# Patient Record
Sex: FEMALE | Race: WHITE | Employment: UNEMPLOYED | URBAN - METROPOLITAN AREA
[De-identification: names, ages, dates, MRNs, and addresses within clinical notes are randomized per-mention and may not be internally consistent; named-entity substitution may affect disease eponyms.]

---

## 2017-08-03 ENCOUNTER — HOSPITAL ENCOUNTER (EMERGENCY)
Age: 37
Discharge: HOME | End: 2017-08-03
Attending: EMERGENCY MEDICINE
Payer: MEDICARE

## 2017-08-03 VITALS
HEIGHT: 67 IN | BODY MASS INDEX: 29.03 KG/M2 | RESPIRATION RATE: 16 BRPM | TEMPERATURE: 97.2 F | HEART RATE: 81 BPM | SYSTOLIC BLOOD PRESSURE: 130 MMHG | OXYGEN SATURATION: 96 % | DIASTOLIC BLOOD PRESSURE: 86 MMHG | WEIGHT: 185 LBS

## 2017-08-03 DIAGNOSIS — R21 RASH: Primary | ICD-10-CM

## 2017-08-03 PROCEDURE — 99282 EMERGENCY DEPT VISIT SF MDM: CPT

## 2017-08-03 RX ORDER — CLOTRIMAZOLE AND BETAMETHASONE DIPROPIONATE 10; .64 MG/G; MG/G
CREAM TOPICAL
Qty: 45 G | Refills: 1 | Status: SHIPPED | OUTPATIENT
Start: 2017-08-03 | End: 2017-11-30

## 2017-08-03 ASSESSMENT — PAIN DESCRIPTION - FREQUENCY: FREQUENCY: INTERMITTENT

## 2017-08-03 ASSESSMENT — PAIN SCALES - GENERAL: PAINLEVEL_OUTOF10: 3

## 2017-08-03 ASSESSMENT — PAIN DESCRIPTION - LOCATION: LOCATION: VAGINA

## 2017-11-23 ENCOUNTER — APPOINTMENT (OUTPATIENT)
Dept: ULTRASOUND IMAGING | Age: 37
End: 2017-11-23
Payer: MEDICARE

## 2017-11-23 ENCOUNTER — HOSPITAL ENCOUNTER (EMERGENCY)
Age: 37
Discharge: HOME | End: 2017-11-24
Attending: EMERGENCY MEDICINE
Payer: MEDICARE

## 2017-11-23 VITALS
HEIGHT: 68 IN | DIASTOLIC BLOOD PRESSURE: 78 MMHG | SYSTOLIC BLOOD PRESSURE: 127 MMHG | BODY MASS INDEX: 31.07 KG/M2 | TEMPERATURE: 97.7 F | HEART RATE: 98 BPM | RESPIRATION RATE: 18 BRPM | OXYGEN SATURATION: 98 % | WEIGHT: 205 LBS

## 2017-11-23 DIAGNOSIS — O46.90 VAGINAL BLEEDING IN PREGNANCY: Primary | ICD-10-CM

## 2017-11-23 DIAGNOSIS — N76.0 BV (BACTERIAL VAGINOSIS): ICD-10-CM

## 2017-11-23 DIAGNOSIS — B96.89 BV (BACTERIAL VAGINOSIS): ICD-10-CM

## 2017-11-23 LAB
ABO/RH: NORMAL
BILIRUBIN URINE: NEGATIVE
COLOR: YELLOW
COMMENT UA: ABNORMAL
GLUCOSE URINE: NEGATIVE
HCG QUANTITATIVE: 8794 IU/L
HCT VFR BLD CALC: 33.5 % (ref 36.3–47.1)
HEMOGLOBIN: 11.3 G/DL (ref 11.9–15.1)
KETONES, URINE: ABNORMAL
LEUKOCYTE ESTERASE, URINE: NEGATIVE
MCH RBC QN AUTO: 32.5 PG (ref 25.2–33.5)
MCHC RBC AUTO-ENTMCNC: 33.7 G/DL (ref 28.4–34.8)
MCV RBC AUTO: 96.3 FL (ref 82.6–102.9)
NITRITE, URINE: NEGATIVE
PDW BLD-RTO: 12.4 % (ref 11.8–14.4)
PH UA: 5.5 (ref 5–8)
PLATELET # BLD: 228 K/UL (ref 138–453)
PMV BLD AUTO: 9.8 FL (ref 8.1–13.5)
PROTEIN UA: NEGATIVE
RBC # BLD: 3.48 M/UL (ref 3.95–5.11)
SPECIFIC GRAVITY UA: 1.02 (ref 1–1.03)
TURBIDITY: CLEAR
URINE HGB: NEGATIVE
UROBILINOGEN, URINE: NORMAL
WBC # BLD: 9.2 K/UL (ref 3.5–11.3)

## 2017-11-23 PROCEDURE — 86901 BLOOD TYPING SEROLOGIC RH(D): CPT

## 2017-11-23 PROCEDURE — 99284 EMERGENCY DEPT VISIT MOD MDM: CPT

## 2017-11-23 PROCEDURE — 87480 CANDIDA DNA DIR PROBE: CPT

## 2017-11-23 PROCEDURE — 87660 TRICHOMONAS VAGIN DIR PROBE: CPT

## 2017-11-23 PROCEDURE — 85027 COMPLETE CBC AUTOMATED: CPT

## 2017-11-23 PROCEDURE — 86900 BLOOD TYPING SEROLOGIC ABO: CPT

## 2017-11-23 PROCEDURE — 87591 N.GONORRHOEAE DNA AMP PROB: CPT

## 2017-11-23 PROCEDURE — 81003 URINALYSIS AUTO W/O SCOPE: CPT

## 2017-11-23 PROCEDURE — 87510 GARDNER VAG DNA DIR PROBE: CPT

## 2017-11-23 PROCEDURE — 87491 CHLMYD TRACH DNA AMP PROBE: CPT

## 2017-11-23 PROCEDURE — 84702 CHORIONIC GONADOTROPIN TEST: CPT

## 2017-11-23 PROCEDURE — 76817 TRANSVAGINAL US OBSTETRIC: CPT

## 2017-11-24 LAB
C TRACH DNA GENITAL QL NAA+PROBE: NEGATIVE
DIRECT EXAM: ABNORMAL
Lab: ABNORMAL
N. GONORRHOEAE DNA: NEGATIVE
SPECIMEN DESCRIPTION: ABNORMAL
STATUS: ABNORMAL

## 2017-11-24 RX ORDER — METRONIDAZOLE 500 MG/1
500 TABLET ORAL 2 TIMES DAILY
Qty: 14 TABLET | Refills: 0 | Status: SHIPPED | OUTPATIENT
Start: 2017-11-24 | End: 2017-12-01

## 2017-11-24 RX ORDER — PRENATAL WITH FERROUS FUM AND FOLIC ACID 3080; 920; 120; 400; 22; 1.84; 3; 20; 10; 1; 12; 200; 27; 25; 2 [IU]/1; [IU]/1; MG/1; [IU]/1; MG/1; MG/1; MG/1; MG/1; MG/1; MG/1; UG/1; MG/1; MG/1; MG/1; MG/1
1 TABLET ORAL DAILY
Qty: 30 TABLET | Refills: 0 | Status: ON HOLD | OUTPATIENT
Start: 2017-11-24 | End: 2018-04-21 | Stop reason: HOSPADM

## 2017-11-24 ASSESSMENT — ENCOUNTER SYMPTOMS
DIARRHEA: 0
NAUSEA: 0
ABDOMINAL PAIN: 0
VOMITING: 0
SHORTNESS OF BREATH: 0
CONSTIPATION: 0

## 2017-11-24 NOTE — ED PROVIDER NOTES
[facility]     Emergency Department     Faculty Attestation    I performed a history and physical examination of the patient and discussed management with the resident. I reviewed the residents note and agree with the documented findings and plan of care. Any areas of disagreement are noted on the chart. I was personally present for the key portions of any procedures. I have documented in the chart those procedures where I was not present during the key portions. I have reviewed the emergency nurses triage note. I agree with the chief complaint, past medical history, past surgical history, allergies, medications, social and family history as documented unless otherwise noted below. For Physician Assistant/ Nurse Practitioner cases/documentation I have personally evaluated this patient and have completed at least one if not all key elements of the E/M (history, physical exam, and MDM). Additional findings are as noted. I have personally seen and evaluated the patient. I find the patient's history and physical exam are consistent with the NP/PA documentation. I agree with the care provided, treatment rendered, disposition and follow-up plan. Vaginal bleeding without abdominal pain currently pregnant. Abdomen is soft without peritoneal signs      Critical Care     Mendy Corley M.D.   Attending Emergency  Physician              Thomas Saleh MD  11/23/17 5514

## 2017-11-24 NOTE — ED PROVIDER NOTES
[facility] ED  Emergency Department Encounter  Emergency Medicine Resident     Pt Name: Valorie Chowdhury  MRN: [id number]  [place] 1980  Date of evaluation: 17  PCP:  No primary care provider on file. CHIEF COMPLAINT       Chief Complaint   Patient presents with    Vaginal Bleeding     16 weeks pregnant, no pain, seen at [address] today with a pelvic exam       HISTORY OF PRESENT ILLNESS  (Location/Symptom, Timing/Onset, Context/Setting, Quality, Duration, Modifying Factors, Severity.)      Valorie Chowdhury is a 40 y.o. female who presents with Painless vaginal bleeding. Patient states she is approximately 16 weeks pregnant. She is a .  States she started having vaginal bleeding today. Denies any pain. No vaginal discharge or itching. No dysuria, urinary frequency. Patient denies any fever or chills. No nausea or vomiting. Her last menstrual period was  On . Patient has not had any prenatal workup. She was seen at [facility] today and was told everything was fine and discharged. PAST MEDICAL / SURGICAL / SOCIAL / FAMILY HISTORY     Patient denies any past medical history    Social History     Social History    Marital status: Single     Spouse name: N/A    Number of children: N/A    Years of education: N/A     Occupational History    Not on file. Social History Main Topics    Smoking status: Current Every Day Smoker     Packs/day: 0.50     Types: Cigarettes    Smokeless tobacco: Never Used    Alcohol use No    Drug use: No    Sexual activity: Not on file     Other Topics Concern    Not on file     Social History Narrative    No narrative on file       History reviewed. No pertinent family history. Allergies:  Review of patient's allergies indicates no known allergies. Home Medications:  Prior to Admission medications    Medication Sig Start Date End Date Taking?  Authorizing Provider   clotrimazole-betamethasone (LOTRISONE) 1-0.05 % cream Apply topically 2 times daily to rash for 7 -10 days 8/3/17   Claire JUAN Revelesde, DO   Gauze Pads & Dressings (GAUZE DRESSING) 4\"X4\" PADS 1 each by Does not apply route daily as needed (for dressing changes) 11/3/16   Maxi Mix MD       REVIEW OF SYSTEMS    (2-9 systems for level 4, 10 or more for level 5)      Review of Systems   Constitutional: Negative for chills and fever. Respiratory: Negative for shortness of breath. Cardiovascular: Negative for chest pain. Gastrointestinal: Negative for abdominal pain, constipation, diarrhea, nausea and vomiting. Genitourinary: Positive for vaginal bleeding. Negative for dysuria, frequency and vaginal discharge. Skin: Negative for rash. Psychiatric/Behavioral: Negative for agitation and confusion. PHYSICAL EXAM   (up to 7 for level 4, 8 or more for level 5)      INITIAL VITALS:   /78   Pulse 98   Temp 97.7 °F (36.5 °C) (Oral)   Resp 18   Ht 5' 8\" (1.727 m)   Wt 205 lb (93 kg)   LMP 07/01/2017   SpO2 98%   BMI 31.17 kg/m²     Physical Exam   Constitutional: She is oriented to person, place, and time. She appears well-developed and well-nourished. No distress. HENT:   Head: Normocephalic and atraumatic. Eyes: Pupils are equal, round, and reactive to light. Neck: Normal range of motion. Neck supple. Cardiovascular: Normal rate, regular rhythm, normal heart sounds and intact distal pulses. No murmur heard. Pulmonary/Chest: Effort normal and breath sounds normal. No respiratory distress. She has no wheezes. She has no rales. She exhibits no tenderness. Abdominal: Soft. She exhibits no distension. There is no tenderness. There is no rebound and no guarding. Abdomen soft, nondistended, nontender. Genitourinary:   Genitourinary Comments: Normal vaginal mucosa, no cervical motion tenderness, no suprapubic tenderness, no adnexal tenderness. Cervical os closed. No significant discharge appreciated. No signs of bleeding. placenta is located posterior. BPD measures 3.6 cm. Head circumference measures 13.3 cm. Abdominal circumference measures 11.4 cm. Femur length measures 2.2 cm. Estimated fetal weight is 176 grams. Estimated gestational age by current ultrasound is 12 weeks with a due date May 3, 2018. Single live intrauterine pregnancy with gestational age of 12 weeks by current sonographic biometry. The estimated due date is May 3, 2018. EKG  None    All EKG's are interpreted by the Emergency Department Physician who either signs or Co-signs this chart in the absence of a cardiologist.    EMERGENCY DEPARTMENT COURSE:  After discussion with attending physician, he does not think patient should get a pelvic examination at this time. Bedside ultrasound revealed gestational age between 23 and 25 weeks. Attending is recommending Ob consult & possibly sending patient upstairs for evaluation. Spoke with OB resident regarding patient. Wants us to get the ultrasound done in the ER and call them back once the US is read. Ultrasound reveals gestational age of 12 weeks. Placenta is posterior. Spoke with OB resident who states that patient does not need to come upstairs for evaluation at this time. Okay to proceed with pelvic examination. Patient reassessed and updated on results of ultrasound and plan to do the pelvic examination. Patient reassessed; sleeping. Patient was awakened. Updated on results. Vaginitis probe positive for BV. We'll treat with Flagyl. Patient discharged home with prenatal vitamins. She was given the number for the [place] clinic. Patient states she will call on Monday to make an appointment for follow-up. PROCEDURES:  EARLY PREGNANCY ULTRASOUND:  A limited, bedside pelvic ultrasound was performed using a transabdominal probe. The medical necessity was to evaluate for signs of an intrauterine versus ectopic pregnancy.   The structures studied were the uterus and its contents, bladder,

## 2017-11-24 NOTE — ED NOTES
Pt to ed from home c/o vaginal bleeding, bright red starting today. Pt states she went to [facility] to be seen \"all they did was a pelvic. \" pt is 16 weeks pregnant, does not follow up with OBGYN. Pt states the vaginal bleeding \"is like a normal period\" pt denies hematuria or dysuria.       Mali Mack RN  11/23/17 [id number]

## 2017-11-30 ENCOUNTER — OFFICE VISIT (OUTPATIENT)
Dept: OBGYN CLINIC | Age: 37
End: 2017-11-30
Payer: MEDICARE

## 2017-11-30 VITALS
HEART RATE: 93 BPM | SYSTOLIC BLOOD PRESSURE: 116 MMHG | DIASTOLIC BLOOD PRESSURE: 74 MMHG | RESPIRATION RATE: 18 BRPM | WEIGHT: 220 LBS | BODY MASS INDEX: 33.45 KG/M2

## 2017-11-30 DIAGNOSIS — N91.2 AMENORRHEA: Primary | ICD-10-CM

## 2017-11-30 DIAGNOSIS — Z3A.18 18 WEEKS GESTATION OF PREGNANCY: ICD-10-CM

## 2017-11-30 PROCEDURE — 4004F PT TOBACCO SCREEN RCVD TLK: CPT | Performed by: CLINICAL NURSE SPECIALIST

## 2017-11-30 PROCEDURE — G8484 FLU IMMUNIZE NO ADMIN: HCPCS | Performed by: CLINICAL NURSE SPECIALIST

## 2017-11-30 PROCEDURE — 99213 OFFICE O/P EST LOW 20 MIN: CPT | Performed by: CLINICAL NURSE SPECIALIST

## 2017-11-30 PROCEDURE — G8427 DOCREV CUR MEDS BY ELIG CLIN: HCPCS | Performed by: CLINICAL NURSE SPECIALIST

## 2017-11-30 PROCEDURE — G8417 CALC BMI ABV UP PARAM F/U: HCPCS | Performed by: CLINICAL NURSE SPECIALIST

## 2017-11-30 RX ORDER — .ALPHA.-TOCOPHEROL ACETATE, DL-, ASCORBIC ACID, CHOLECALCIFEROL, CYANOCOBALAMIN, FOLIC ACID, FERROUS FUMARATE, CALCIUM PHOSPHATE, DIBASIC, ANHYDROUS, NIACINAMIDE, PYRIDOXINE HYDROCHLORIDE, RIBOFLAVIN, THIAMINE MONONITRATE, AND VITAMIN A ACETATE 15; 60; 400; 4.5; 1; 27; 50; 13.5; 1.05; 1.2; 1.05; 25 [IU]/1; MG/1; [IU]/1; UG/1; MG/1; MG/1; MG/1; MG/1; MG/1; MG/1; MG/1; [IU]/1
1 TABLET ORAL DAILY
Qty: 30 TABLET | Refills: 11 | Status: SHIPPED | OUTPATIENT
Start: 2017-11-30 | End: 2018-01-16 | Stop reason: CLARIF

## 2017-12-20 ENCOUNTER — HOSPITAL ENCOUNTER (EMERGENCY)
Age: 37
Discharge: HOME | End: 2017-12-20
Attending: EMERGENCY MEDICINE
Payer: MEDICARE

## 2017-12-20 VITALS
HEIGHT: 64 IN | DIASTOLIC BLOOD PRESSURE: 56 MMHG | SYSTOLIC BLOOD PRESSURE: 123 MMHG | OXYGEN SATURATION: 99 % | HEART RATE: 96 BPM | RESPIRATION RATE: 14 BRPM | TEMPERATURE: 98.2 F | BODY MASS INDEX: 34.15 KG/M2 | WEIGHT: 200 LBS

## 2017-12-20 DIAGNOSIS — J40 BRONCHITIS: Primary | ICD-10-CM

## 2017-12-20 LAB
DIRECT EXAM: NORMAL
Lab: NORMAL
Lab: NORMAL
SPECIMEN DESCRIPTION: NORMAL
STATUS: NORMAL
STATUS: NORMAL

## 2017-12-20 PROCEDURE — 99283 EMERGENCY DEPT VISIT LOW MDM: CPT

## 2017-12-20 PROCEDURE — 87880 STREP A ASSAY W/OPTIC: CPT

## 2017-12-20 PROCEDURE — 87804 INFLUENZA ASSAY W/OPTIC: CPT

## 2017-12-20 RX ORDER — AMOXICILLIN 500 MG/1
500 CAPSULE ORAL 2 TIMES DAILY
Qty: 20 CAPSULE | Refills: 0 | Status: SHIPPED | OUTPATIENT
Start: 2017-12-20 | End: 2017-12-30

## 2017-12-20 ASSESSMENT — ENCOUNTER SYMPTOMS
COUGH: 1
SHORTNESS OF BREATH: 0
WHEEZING: 0
ABDOMINAL PAIN: 0
NAUSEA: 0
SORE THROAT: 1
VOMITING: 0

## 2017-12-20 ASSESSMENT — PAIN SCALES - GENERAL: PAINLEVEL_OUTOF10: 8

## 2017-12-20 ASSESSMENT — PAIN DESCRIPTION - LOCATION: LOCATION: THROAT

## 2017-12-20 ASSESSMENT — PAIN DESCRIPTION - PAIN TYPE: TYPE: ACUTE PAIN

## 2017-12-20 NOTE — ED PROVIDER NOTES
[address] ED  eMERGENCY dEPARTMENT eNCOUnter   Independent Attestation     Pt Name: Taco Allred  MRN: [id number]  Ann Mariegfbhavya 1980  Date of evaluation: 12/20/17     Taco Allred is a 40 y.o. female with CC: Pharyngitis and Cough      Based on the medical record the care appears appropriate. I was personally available for consultation in the Emergency Department.     Zenon Burns MD  Attending Emergency Physician                    Zenon Burns MD  12/20/17 [id number]

## 2017-12-20 NOTE — ED PROVIDER NOTES
[address] ED  eMERGENCY dEPARTMENT eNCOUnter      Pt Name: Jay Larson  MRN: [id number]  [place] 1980  Date of evaluation: 12/20/2017  Provider: Yary Oliveros PA-C    CHIEF COMPLAINT       Chief Complaint   Patient presents with    Pharyngitis    Cough           HISTORY OF PRESENT ILLNESS  (Location/Symptom, Timing/Onset, Context/Setting, Quality, Duration, Modifying Factors, Severity.)   Jay Larson is a 40 y.o. female who is 20 weeks pregnant presents to the emergency department with complaints of sore throat, congestion, rhinorrhea, cough, and headache for 3 weeks. Cough is non-productive. Pt states cough began before sore throat started. Pt denies fever, chills, ear pain, cp, sob, nausea, vomiting, abd pain, abd cramping, vaginal bleeding. Pt has been trying home remedies. No other complaints. Nursing Notes were reviewed. REVIEW OF SYSTEMS    (2-9 systems for level 4, 10 or more for level 5)     Review of Systems   Constitutional: Negative for chills and fever. HENT: Positive for congestion and sore throat. Negative for ear pain. Respiratory: Positive for cough. Negative for shortness of breath and wheezing. Cardiovascular: Negative for chest pain. Gastrointestinal: Negative for abdominal pain, nausea and vomiting. Neurological: Positive for headaches. Negative for dizziness and light-headedness. Except as noted above the remainder of the review of systems was reviewed and negative. PAST MEDICAL HISTORY   History reviewed. No pertinent past medical history. None otherwise stated in nurses notes    SURGICAL HISTORY     History reviewed. No pertinent surgical history.   None otherwise stated in nurses notes    Luis Rubin [address]       Discharge Medication List as of 12/20/2017 11:14 AM      CONTINUE these medications which have NOT CHANGED    Details   !! Prenatal Vit-Fe Fumarate-FA (PNV FOLIC ACID + IRON) 75-7 MG TABS Take 1 tablet by mouth daily, Disp-30 tablet, R-11Normal      !! Prenatal Vit-Fe Fumarate-FA (PRENATAL VITAMIN) 27-1 MG TABS tablet Take 1 tablet by mouth daily, Disp-30 tablet, R-0Print       !! - Potential duplicate medications found. Please discuss with provider. ALLERGIES     Review of patient's allergies indicates no known allergies. FAMILY HISTORY     History reviewed. No pertinent family history. No family status information on file. None otherwise stated in nurses notes    SOCIAL HISTORY      reports that she has been smoking Cigarettes. She has been smoking about 0.50 packs per day. She has never used smokeless tobacco. She reports that she does not drink alcohol or use drugs. lives at home with others     PHYSICAL EXAM    (up to 7 for level 4, 8 or more for level 5)     ED Triage Vitals [12/20/17 1017]   BP Temp Temp Source Pulse Resp SpO2 Height Weight   (!) 123/56 98.2 °F (36.8 °C) Oral 96 14 99 % 5' 4\" (1.626 m) 200 lb (90.7 kg)       Physical Exam   Nursing note and vitals reviewed. Constitutional: Oriented to person, place, and time and well-developed, well-nourished. Pt sounds congesting. Head: Normocephalic and atraumatic. Ear: External ears normal. TM non-erythematous bilaterally with no canal erythema. No mastoid tenderness. Nose: Nose normal and midline. Eyes: Conjunctivae and EOM are normal. Pupils are equal, round, and reactive to light. Neck: Normal range of motion. Neck supple. Throat: Posterior pharynx is erythematous with no exudates, airway is patent, no swelling  Cardiovascular: Normal rate, regular rhythm, normal heart sounds and intact distal pulses. Pulmonary/Chest: Effort normal and breath sounds normal. No respiratory distress. No wheezes. No rales. No chest tenderness. Abdominal: Soft. Bowel sounds are normal. Distension due to pregnancy. No mass. There is no tenderness. There is no rebound and no guarding. Musculoskeletal: Normal range of motion.    Neurological: Alert Medication List as of 12/20/2017 11:14 AM      START taking these medications    Details   amoxicillin (AMOXIL) 500 MG capsule Take 1 capsule by mouth 2 times daily for 10 days, Disp-20 capsule, R-0Print             Follow-up:  [facility] ED  [facility] [address]  [phone]    If symptoms worsen    pcp  see clinic list            Final Impression:   1.  Bronchitis               (Please note that portions of this note were completed with a voice recognition program.  Efforts were made to edit the dictations but occasionally words are mis-transcribed.)      (Please note that portions of this note were completed with a voice recognition program.  Efforts were made to edit the dictations but occasionally words are mis-transcribed.)    Luis Rand. [place] [address], PA-C  12/20/17 1149

## 2018-01-09 ENCOUNTER — HOSPITAL ENCOUNTER (OUTPATIENT)
Age: 38
Setting detail: SPECIMEN
Discharge: HOME | End: 2018-01-09
Payer: MEDICARE

## 2018-01-09 ENCOUNTER — INITIAL PRENATAL (OUTPATIENT)
Dept: OBGYN CLINIC | Age: 38
End: 2018-01-09
Payer: MEDICARE

## 2018-01-09 VITALS
DIASTOLIC BLOOD PRESSURE: 82 MMHG | SYSTOLIC BLOOD PRESSURE: 124 MMHG | BODY MASS INDEX: 39.65 KG/M2 | WEIGHT: 231 LBS | HEART RATE: 92 BPM

## 2018-01-09 DIAGNOSIS — O09.30 LATE PRENATAL CARE: ICD-10-CM

## 2018-01-09 DIAGNOSIS — Z34.90 ENCOUNTER FOR SUPERVISION OF NORMAL PREGNANCY, ANTEPARTUM, UNSPECIFIED GRAVIDITY: Primary | ICD-10-CM

## 2018-01-09 DIAGNOSIS — Z3A.23 23 WEEKS GESTATION OF PREGNANCY: ICD-10-CM

## 2018-01-09 DIAGNOSIS — O09.522 ELDERLY MULTIGRAVIDA IN SECOND TRIMESTER: ICD-10-CM

## 2018-01-09 DIAGNOSIS — Z3A.18 18 WEEKS GESTATION OF PREGNANCY: ICD-10-CM

## 2018-01-09 DIAGNOSIS — N91.2 AMENORRHEA: ICD-10-CM

## 2018-01-09 DIAGNOSIS — O09.93 HIGH-RISK PREGNANCY IN THIRD TRIMESTER: ICD-10-CM

## 2018-01-09 LAB
ABO/RH: NORMAL
ABSOLUTE EOS #: 0.13 K/UL (ref 0–0.44)
ABSOLUTE IMMATURE GRANULOCYTE: 0.12 K/UL (ref 0–0.3)
ABSOLUTE LYMPH #: 2.29 K/UL (ref 1.1–3.7)
ABSOLUTE MONO #: 0.53 K/UL (ref 0.1–1.2)
ANTIBODY SCREEN: NEGATIVE
BASOPHILS # BLD: 0 % (ref 0–2)
BASOPHILS ABSOLUTE: 0.03 K/UL (ref 0–0.2)
BILIRUBIN URINE: NEGATIVE
COLOR: YELLOW
COMMENT UA: NORMAL
DIFFERENTIAL TYPE: ABNORMAL
EOSINOPHILS RELATIVE PERCENT: 1 % (ref 1–4)
GLUCOSE URINE: NEGATIVE
HCT VFR BLD CALC: 35 % (ref 36.3–47.1)
HEMOGLOBIN: 11.8 G/DL (ref 11.9–15.1)
HEPATITIS B SURFACE ANTIGEN: NONREACTIVE
IMMATURE GRANULOCYTES: 1 %
KETONES, URINE: NEGATIVE
LEUKOCYTE ESTERASE, URINE: NEGATIVE
LYMPHOCYTES # BLD: 16 % (ref 24–43)
MCH RBC QN AUTO: 32.3 PG (ref 25.2–33.5)
MCHC RBC AUTO-ENTMCNC: 33.7 G/DL (ref 28.4–34.8)
MCV RBC AUTO: 95.9 FL (ref 82.6–102.9)
MONOCYTES # BLD: 4 % (ref 3–12)
NITRITE, URINE: NEGATIVE
PDW BLD-RTO: 12.6 % (ref 11.8–14.4)
PH UA: 6.5 (ref 5–8)
PLATELET # BLD: 278 K/UL (ref 138–453)
PLATELET ESTIMATE: ABNORMAL
PMV BLD AUTO: 9.9 FL (ref 8.1–13.5)
PROTEIN UA: NEGATIVE
RBC # BLD: 3.65 M/UL (ref 3.95–5.11)
RBC # BLD: ABNORMAL 10*6/UL
RUBV IGG SER QL: >500 IU/ML
SEG NEUTROPHILS: 78 % (ref 36–65)
SEGMENTED NEUTROPHILS ABSOLUTE COUNT: 11.3 K/UL (ref 1.5–8.1)
SICKLE CELL SCREEN: NEGATIVE
SPECIFIC GRAVITY UA: 1.02 (ref 1–1.03)
T. PALLIDUM, IGG: NONREACTIVE
TSH SERPL DL<=0.05 MIU/L-ACNC: 1.43 MIU/L (ref 0.3–5)
TURBIDITY: CLEAR
URINE HGB: NEGATIVE
UROBILINOGEN, URINE: NORMAL
WBC # BLD: 14.4 K/UL (ref 3.5–11.3)
WBC # BLD: ABNORMAL 10*3/UL

## 2018-01-09 PROCEDURE — G8427 DOCREV CUR MEDS BY ELIG CLIN: HCPCS | Performed by: SPECIALIST

## 2018-01-09 PROCEDURE — 99211 OFF/OP EST MAY X REQ PHY/QHP: CPT | Performed by: SPECIALIST

## 2018-01-09 PROCEDURE — H1000 PRENATAL CARE ATRISK ASSESSM: HCPCS | Performed by: SPECIALIST

## 2018-01-09 PROCEDURE — G8417 CALC BMI ABV UP PARAM F/U: HCPCS | Performed by: SPECIALIST

## 2018-01-09 RX ORDER — PROMETHAZINE HYDROCHLORIDE 25 MG/1
25 TABLET ORAL EVERY 8 HOURS PRN
Qty: 30 TABLET | Refills: 2 | Status: ON HOLD | OUTPATIENT
Start: 2018-01-09 | End: 2018-04-21 | Stop reason: HOSPADM

## 2018-01-09 NOTE — PATIENT INSTRUCTIONS
link.  Current as of: March 16, 2017  Content Version: 11.5  © 6207-1961 [employer]. Care instructions adapted under license by [facility] ([facility]). If you have questions about a medical condition or this instruction, always ask your healthcare professional. [address] any warranty or liability for your use of this information. Patient Education        Learning About When to Call Your Doctor During Pregnancy (Up to 20 Weeks)  Your Care Instructions    It's common to have concerns about what might be a problem during pregnancy. Although most pregnant women don't have any serious problems, it's important to know when to call your doctor if you have certain symptoms. These are general suggestions. Your doctor may give you some more information about when to call. When to call your doctor (up to 20 weeks)  Call 911 anytime you think you may need emergency care. For example, call if:  · You passed out (lost consciousness). Call your doctor now or seek immediate medical care if:  · You have a fever. · You have vaginal bleeding. · You are dizzy or lightheaded, or you feel like you may faint. · You have symptoms of a urinary tract infection. These may include:  ¨ Pain or burning when you urinate. ¨ A frequent need to urinate without being able to pass much urine. ¨ Pain in the flank, which is just below the rib cage and above the waist on either side of the back. ¨ Blood in your urine. · You have belly pain. · You think you are having contractions. · You have a sudden release of fluid from your vagina. Watch closely for changes in your health, and be sure to contact your doctor if:  · You have vaginal discharge that smells bad. · You have other concerns about your pregnancy. Follow-up care is a key part of your treatment and safety. Be sure to make and go to all appointments, and call your doctor if you are having problems.  It's also a good idea to know your test results and keep a list of the medicines you take. Where can you learn more? Go to https://chpepiceweb.healthFloor64. org and sign in to your [employer] account. Enter P350 in the [facility] box to learn more about \"Learning About When to Call Your Doctor During Pregnancy (Up to 20 Weeks). \"     If you do not have an account, please click on the \"Sign Up Now\" link. Current as of: March 16, 2017  Content Version: 11.5  © 2795-8825 [employer]. Care instructions adapted under license by [facility] ([facility]). If you have questions about a medical condition or this instruction, always ask your healthcare professional. [place] [address] any warranty or liability for your use of this information. Patient Education        Nutrition During Pregnancy: Care Instructions  Your Care Instructions    Healthy eating when you are pregnant is important for you and your baby. It can help you feel well and have a successful pregnancy and delivery. During pregnancy your nutrition needs increase. Even if you have excellent eating habits, your doctor may recommend a multivitamin to make sure you get enough iron and folic acid. Many pregnant women wonder how much weight they should gain. In general, women who were at a healthy weight before they became pregnant should gain between 25 and 35 pounds. Women who were overweight before pregnancy are usually advised to gain 15 to 25 pounds. Women who were underweight before pregnancy are usually advised to gain 28 to 40 pounds. Your doctor will work with you to set a weight goal that is right for you. Gaining a healthy amount of weight helps you have a healthy baby. Follow-up care is a key part of your treatment and safety. Be sure to make and go to all appointments, and call your doctor if you are having problems. It's also a good idea to know your test results and keep a list of the medicines you take. How can you care for yourself at home?   · Eat plenty in your health, and be sure to contact your doctor if you have any problems. Where can you learn more? Go to https://chpepiceweb.healthCamileon [email or web address] org and sign in to your [employer] account. Enter Y785 in the [employer] box to learn more about \"Nutrition During Pregnancy: Care Instructions. \"     If you do not have an account, please click on the \"Sign Up Now\" link. Current as of: March 16, 2017  Content Version: 11.5  © 0280-9267 Healthwise[employer]. Care instructions adapted under license by [facility] ([facility]). If you have questions about a medical condition or this instruction, always ask your healthcare professional. [address] any warranty or liability for your use of this information.

## 2018-01-10 LAB — HIV AG/AB: NONREACTIVE

## 2018-01-16 ENCOUNTER — HOSPITAL ENCOUNTER (OUTPATIENT)
Age: 38
Setting detail: SPECIMEN
Discharge: HOME | End: 2018-01-16
Payer: MEDICARE

## 2018-01-16 ENCOUNTER — ROUTINE PRENATAL (OUTPATIENT)
Dept: OBGYN CLINIC | Age: 38
End: 2018-01-16
Payer: MEDICARE

## 2018-01-16 VITALS
BODY MASS INDEX: 38.96 KG/M2 | SYSTOLIC BLOOD PRESSURE: 122 MMHG | WEIGHT: 227 LBS | HEART RATE: 104 BPM | DIASTOLIC BLOOD PRESSURE: 72 MMHG

## 2018-01-16 DIAGNOSIS — Z3A.24 24 WEEKS GESTATION OF PREGNANCY: ICD-10-CM

## 2018-01-16 DIAGNOSIS — O09.72 SUPERVISION OF HIGH RISK PREGNANCY DUE TO SOCIAL PROBLEMS IN SECOND TRIMESTER: Primary | ICD-10-CM

## 2018-01-16 PROCEDURE — 99213 OFFICE O/P EST LOW 20 MIN: CPT | Performed by: SPECIALIST

## 2018-01-16 PROCEDURE — G8484 FLU IMMUNIZE NO ADMIN: HCPCS | Performed by: SPECIALIST

## 2018-01-16 PROCEDURE — G8427 DOCREV CUR MEDS BY ELIG CLIN: HCPCS | Performed by: SPECIALIST

## 2018-01-16 PROCEDURE — 4004F PT TOBACCO SCREEN RCVD TLK: CPT | Performed by: SPECIALIST

## 2018-01-16 PROCEDURE — G8417 CALC BMI ABV UP PARAM F/U: HCPCS | Performed by: SPECIALIST

## 2018-01-16 NOTE — PATIENT INSTRUCTIONS
· You have belly pain or cramping. ? · You have a fever. ? · You have had regular contractions (with or without pain) for an hour. This means that you have 8 or more within 1 hour or 4 or more in 20 minutes after you change your position and drink fluids. ? · You have a sudden release of fluid from the vagina. ? · You have low back pain or pelvic pressure that does not go away. ? · You notice that your baby has stopped moving or is moving much less than normal.   ? Watch closely for changes in your health, and be sure to contact your doctor if:  ? · You have any new symptoms, such as a fever. ? · You have vaginal discharge that smells bad. Where can you learn more? Go to https://ZayapepicEnhanceWorkseb.Civicon. org and sign in to your SupportPay account. Enter S194 in the [employer]ant box to learn more about \"Prenatal Tests During a High-Risk Pregnancy: Care Instructions. \"     If you do not have an account, please click on the \"Sign Up Now\" link. Current as of: March 16, 2017  Content Version: 11.5  © 3390-6659 Healthwise, [employer]. Care instructions adapted under license by [facility] ([facility]). If you have questions about a medical condition or this instruction, always ask your healthcare professional. [address] any warranty or liability for your use of this information.

## 2018-01-16 NOTE — PROGRESS NOTES
Shweta Ramirez is a 40 y.o. female 24w6d    [id number]    OB History    Para Term  AB Living   4 3 3     3   SAB TAB Ectopic Molar Multiple Live Births             3      # Outcome Date GA Lbr Juan Carlos/2nd Weight Sex Delivery Anes PTL Lv   4 Current            3 Term 07 39w0d  6 lb 12 oz (3.062 kg) M Vag-Spont None N MALINDA   2 Term 06 37w0d  4 lb 12 oz (2.155 kg) M Vag-Spont None N MALINDA   1 Term 00 39w0d  6 lb 15 oz (3.147 kg) M Vag-Spont None N MALINDA            Blood pressure 122/72, pulse 104, weight 227 lb (103 kg), last menstrual period 2017. The patient was seen and evaluated. There was positive fetal movements. No contractions or leakage of fluid. Signs and symptoms of  labor as well as labor were reviewed. The patients anatomy ultrasound has been ordered today and patient was advised to schedule appointment for this as soon as possible. The S/S of Pre-Eclampsia were reviewed with the patient in detail. She is to report any of these if they occur. She currently denies any of these. The patient is RH positive Rhogam Ordered: Not indicated. 28 week labs have been ordered today including 1 hour glucose testing and CBC. Patient was advised that this will be done between 27 and 28 gestational weeks. Patient Active Problem List    Diagnosis Date Noted    High-risk pregnancy in third trimester 2018    Late prenatal care 2018    Elderly multigravida in second trimester 2018       1. Supervision of high risk pregnancy due to social problems in second trimester  PAP SMEAR    US OB > 14 weeks single fetus - Clinic Performed    US OB Transvaginal   2. 24 weeks gestation of pregnancy  US OB > 14 weeks single fetus - Clinic Performed    US OB Transvaginal       Patient doing well. Pap smear done today today. Cultures done on 17 were reviewed and were negative.   A second trimester ultrasound and cervical length ultrasound were ordered today and patient was advised to schedule an appointment as soon as possible to have these done. Patient advised to continue taking prenatal vitamins and to rest as necessary. The patient will return to the office for her next visit in 3 weeks. See antepartum flow sheet. Lesia Garay am scribing for, and in the presence of Dr. Bud Danielson. Electronically signed by: Jhonny Merritt 1/16/18 1:45 PM   I agree to the above documentation placed by my scribe Jhonny Merritt. I reviewed the scribe's note and agree with the documented findings and plan of care. Any areas of disagreement are noted on the chart. I have personally evaluated this patient. Additional findings are as noted. I agree with the chief complaint, past medical history, past surgical history, allergies, medications, social and family history as documented unless otherwise noted below.      Electronically signed by Bud Danielson MD on 1/17/2018 at 2:19 AM

## 2018-01-17 LAB — CYSTIC FIBROSIS: NORMAL

## 2018-01-18 ENCOUNTER — PROCEDURE VISIT (OUTPATIENT)
Dept: OBGYN CLINIC | Age: 38
End: 2018-01-18
Payer: MEDICARE

## 2018-01-18 DIAGNOSIS — Z3A.24 24 WEEKS GESTATION OF PREGNANCY: ICD-10-CM

## 2018-01-18 DIAGNOSIS — O09.72 SUPERVISION OF HIGH RISK PREGNANCY DUE TO SOCIAL PROBLEMS IN SECOND TRIMESTER: ICD-10-CM

## 2018-01-18 PROCEDURE — 76805 OB US >/= 14 WKS SNGL FETUS: CPT | Performed by: SPECIALIST

## 2018-01-30 LAB
CYTOLOGY REPORT: NORMAL
HPV SAMPLE: NORMAL
HPV SOURCE: NORMAL
HPV, GENOTYPE 16: NOT DETECTED
HPV, GENOTYPE 18: NOT DETECTED
HPV, HIGH RISK OTHER: NOT DETECTED
HPV, INTERPRETATION: NORMAL

## 2018-02-26 ENCOUNTER — TELEPHONE (OUTPATIENT)
Dept: OBGYN CLINIC | Age: 38
End: 2018-02-26

## 2018-04-10 ENCOUNTER — TELEPHONE (OUTPATIENT)
Dept: OBGYN CLINIC | Age: 38
End: 2018-04-10

## 2018-04-20 ENCOUNTER — HOSPITAL ENCOUNTER (INPATIENT)
Age: 38
LOS: 1 days | Discharge: HOME | DRG: 560 | End: 2018-04-21
Attending: SPECIALIST | Admitting: SPECIALIST
Payer: MEDICARE

## 2018-04-20 LAB
ABO/RH: NORMAL
ABSOLUTE EOS #: 0.1 K/UL (ref 0–0.4)
ABSOLUTE IMMATURE GRANULOCYTE: ABNORMAL K/UL (ref 0–0.3)
ABSOLUTE LYMPH #: 2.5 K/UL (ref 1–4.8)
ABSOLUTE MONO #: 0.8 K/UL (ref 0.1–1.3)
ANTIBODY SCREEN: NEGATIVE
ARM BAND NUMBER: NORMAL
BASOPHILS # BLD: 1 % (ref 0–2)
BASOPHILS ABSOLUTE: 0.1 K/UL (ref 0–0.2)
DIFFERENTIAL TYPE: ABNORMAL
EOSINOPHILS RELATIVE PERCENT: 1 % (ref 0–4)
EXPIRATION DATE: NORMAL
GLUCOSE BLD-MCNC: 81 MG/DL (ref 65–105)
HCT VFR BLD CALC: 36 % (ref 36–46)
HEMOGLOBIN: 12.9 G/DL (ref 12–16)
IMMATURE GRANULOCYTES: ABNORMAL %
LYMPHOCYTES # BLD: 16 % (ref 24–44)
MCH RBC QN AUTO: 34.3 PG (ref 26–34)
MCHC RBC AUTO-ENTMCNC: 36 G/DL (ref 31–37)
MCV RBC AUTO: 95.5 FL (ref 80–100)
MONOCYTES # BLD: 5 % (ref 1–7)
NRBC AUTOMATED: ABNORMAL PER 100 WBC
PDW BLD-RTO: 13.2 % (ref 11.5–14.9)
PLATELET # BLD: 394 K/UL (ref 150–450)
PLATELET ESTIMATE: ABNORMAL
PMV BLD AUTO: 7.8 FL (ref 6–12)
RBC # BLD: 3.77 M/UL (ref 4–5.2)
RBC # BLD: ABNORMAL 10*6/UL
SEG NEUTROPHILS: 77 % (ref 36–66)
SEGMENTED NEUTROPHILS ABSOLUTE COUNT: 11.9 K/UL (ref 1.3–9.1)
WBC # BLD: 15.4 K/UL (ref 3.5–11)
WBC # BLD: ABNORMAL 10*3/UL

## 2018-04-20 PROCEDURE — 87088 URINE BACTERIA CULTURE: CPT

## 2018-04-20 PROCEDURE — 81001 URINALYSIS AUTO W/SCOPE: CPT

## 2018-04-20 PROCEDURE — 86403 PARTICLE AGGLUT ANTBDY SCRN: CPT

## 2018-04-20 PROCEDURE — 3E0S3NZ INTRODUCTION OF ANALGESICS, HYPNOTICS, SEDATIVES INTO EPIDURAL SPACE, PERCUTANEOUS APPROACH: ICD-10-PCS | Performed by: SPECIALIST

## 2018-04-20 PROCEDURE — 4A1HXCZ MONITORING OF PRODUCTS OF CONCEPTION, CARDIAC RATE, EXTERNAL APPROACH: ICD-10-PCS | Performed by: SPECIALIST

## 2018-04-20 PROCEDURE — 2580000003 HC RX 258: Performed by: STUDENT IN AN ORGANIZED HEALTH CARE EDUCATION/TRAINING PROGRAM

## 2018-04-20 PROCEDURE — 6360000002 HC RX W HCPCS: Performed by: STUDENT IN AN ORGANIZED HEALTH CARE EDUCATION/TRAINING PROGRAM

## 2018-04-20 PROCEDURE — 76815 OB US LIMITED FETUS(S): CPT

## 2018-04-20 PROCEDURE — 87086 URINE CULTURE/COLONY COUNT: CPT

## 2018-04-20 PROCEDURE — 86901 BLOOD TYPING SEROLOGIC RH(D): CPT

## 2018-04-20 PROCEDURE — 10907ZC DRAINAGE OF AMNIOTIC FLUID, THERAPEUTIC FROM PRODUCTS OF CONCEPTION, VIA NATURAL OR ARTIFICIAL OPENING: ICD-10-PCS | Performed by: SPECIALIST

## 2018-04-20 PROCEDURE — 85025 COMPLETE CBC W/AUTO DIFF WBC: CPT

## 2018-04-20 PROCEDURE — 87081 CULTURE SCREEN ONLY: CPT

## 2018-04-20 PROCEDURE — 82947 ASSAY GLUCOSE BLOOD QUANT: CPT

## 2018-04-20 PROCEDURE — 1220000000 HC SEMI PRIVATE OB R&B

## 2018-04-20 PROCEDURE — 86900 BLOOD TYPING SEROLOGIC ABO: CPT

## 2018-04-20 PROCEDURE — 59409 OBSTETRICAL CARE: CPT | Performed by: SPECIALIST

## 2018-04-20 PROCEDURE — 7200000001 HC VAGINAL DELIVERY

## 2018-04-20 PROCEDURE — 6370000000 HC RX 637 (ALT 250 FOR IP): Performed by: STUDENT IN AN ORGANIZED HEALTH CARE EDUCATION/TRAINING PROGRAM

## 2018-04-20 PROCEDURE — 36415 COLL VENOUS BLD VENIPUNCTURE: CPT

## 2018-04-20 PROCEDURE — 86850 RBC ANTIBODY SCREEN: CPT

## 2018-04-20 PROCEDURE — 80307 DRUG TEST PRSMV CHEM ANLYZR: CPT

## 2018-04-20 PROCEDURE — 87186 SC STD MICRODIL/AGAR DIL: CPT

## 2018-04-20 PROCEDURE — 88307 TISSUE EXAM BY PATHOLOGIST: CPT

## 2018-04-20 PROCEDURE — 83986 ASSAY PH BODY FLUID NOS: CPT

## 2018-04-20 PROCEDURE — 86780 TREPONEMA PALLIDUM: CPT

## 2018-04-20 RX ORDER — SODIUM CHLORIDE, SODIUM LACTATE, POTASSIUM CHLORIDE, CALCIUM CHLORIDE 600; 310; 30; 20 MG/100ML; MG/100ML; MG/100ML; MG/100ML
INJECTION, SOLUTION INTRAVENOUS CONTINUOUS
Status: DISCONTINUED | OUTPATIENT
Start: 2018-04-20 | End: 2018-04-20

## 2018-04-20 RX ORDER — ONDANSETRON HYDROCHLORIDE 8 MG/1
8 TABLET, FILM COATED ORAL EVERY 8 HOURS PRN
Status: DISCONTINUED | OUTPATIENT
Start: 2018-04-20 | End: 2018-04-22 | Stop reason: HOSPADM

## 2018-04-20 RX ORDER — DOCUSATE SODIUM 100 MG/1
100 CAPSULE, LIQUID FILLED ORAL 2 TIMES DAILY
Status: DISCONTINUED | OUTPATIENT
Start: 2018-04-20 | End: 2018-04-22 | Stop reason: HOSPADM

## 2018-04-20 RX ORDER — ACETAMINOPHEN 500 MG
1000 TABLET ORAL EVERY 6 HOURS PRN
Status: DISCONTINUED | OUTPATIENT
Start: 2018-04-20 | End: 2018-04-20

## 2018-04-20 RX ORDER — ACETAMINOPHEN 500 MG
1000 TABLET ORAL EVERY 6 HOURS PRN
Status: DISCONTINUED | OUTPATIENT
Start: 2018-04-20 | End: 2018-04-22 | Stop reason: HOSPADM

## 2018-04-20 RX ORDER — IBUPROFEN 800 MG/1
800 TABLET ORAL EVERY 8 HOURS PRN
Status: DISCONTINUED | OUTPATIENT
Start: 2018-04-20 | End: 2018-04-22 | Stop reason: HOSPADM

## 2018-04-20 RX ORDER — SIMETHICONE 80 MG
80 TABLET,CHEWABLE ORAL EVERY 6 HOURS PRN
Status: DISCONTINUED | OUTPATIENT
Start: 2018-04-20 | End: 2018-04-22 | Stop reason: HOSPADM

## 2018-04-20 RX ORDER — ONDANSETRON 2 MG/ML
4 INJECTION INTRAMUSCULAR; INTRAVENOUS EVERY 6 HOURS PRN
Status: DISCONTINUED | OUTPATIENT
Start: 2018-04-20 | End: 2018-04-20

## 2018-04-20 RX ORDER — LANOLIN 100 %
OINTMENT (GRAM) TOPICAL PRN
Status: DISCONTINUED | OUTPATIENT
Start: 2018-04-20 | End: 2018-04-22 | Stop reason: HOSPADM

## 2018-04-20 RX ORDER — SODIUM CHLORIDE 0.9 % (FLUSH) 0.9 %
10 SYRINGE (ML) INJECTION 2 TIMES DAILY
Status: DISCONTINUED | OUTPATIENT
Start: 2018-04-21 | End: 2018-04-22 | Stop reason: HOSPADM

## 2018-04-20 RX ORDER — DIPHENHYDRAMINE HCL 25 MG
25 TABLET ORAL EVERY 4 HOURS PRN
Status: DISCONTINUED | OUTPATIENT
Start: 2018-04-20 | End: 2018-04-20

## 2018-04-20 RX ADMIN — WITCH HAZEL 40 EACH: 500 SOLUTION RECTAL; TOPICAL at 23:08

## 2018-04-20 RX ADMIN — DOCUSATE SODIUM 100 MG: 100 CAPSULE, LIQUID FILLED ORAL at 23:08

## 2018-04-20 RX ADMIN — DEXTROSE MONOHYDRATE 5 MILLION UNITS: 5 INJECTION INTRAVENOUS at 21:20

## 2018-04-20 RX ADMIN — Medication 500 MILLI-UNITS/MIN: at 21:38

## 2018-04-20 RX ADMIN — Medication 10 ML: at 23:48

## 2018-04-20 RX ADMIN — SODIUM CHLORIDE, POTASSIUM CHLORIDE, SODIUM LACTATE AND CALCIUM CHLORIDE: 600; 310; 30; 20 INJECTION, SOLUTION INTRAVENOUS at 21:20

## 2018-04-21 VITALS
TEMPERATURE: 98.4 F | SYSTOLIC BLOOD PRESSURE: 127 MMHG | HEART RATE: 88 BPM | RESPIRATION RATE: 16 BRPM | DIASTOLIC BLOOD PRESSURE: 62 MMHG

## 2018-04-21 LAB
-: ABNORMAL
ABSOLUTE EOS #: 0 K/UL (ref 0–0.4)
ABSOLUTE IMMATURE GRANULOCYTE: ABNORMAL K/UL (ref 0–0.3)
ABSOLUTE LYMPH #: 1.97 K/UL (ref 1–4.8)
ABSOLUTE MONO #: 0.49 K/UL (ref 0.1–1.3)
AMORPHOUS: ABNORMAL
AMPHETAMINE SCREEN URINE: NEGATIVE
BACTERIA: ABNORMAL
BARBITURATE SCREEN URINE: NEGATIVE
BASOPHILS # BLD: 0 % (ref 0–2)
BASOPHILS ABSOLUTE: 0 K/UL (ref 0–0.2)
BENZODIAZEPINE SCREEN, URINE: NEGATIVE
BILIRUBIN URINE: NEGATIVE
BUPRENORPHINE URINE: NORMAL
CANNABINOID SCREEN URINE: NEGATIVE
CASTS UA: ABNORMAL /LPF
COCAINE METABOLITE, URINE: NEGATIVE
COLOR: YELLOW
COMMENT UA: ABNORMAL
CRYSTALS, UA: ABNORMAL /HPF
DIFFERENTIAL TYPE: ABNORMAL
EOSINOPHILS RELATIVE PERCENT: 0 % (ref 0–4)
EPITHELIAL CELLS UA: ABNORMAL /HPF
GLUCOSE URINE: NEGATIVE
HCT VFR BLD CALC: 35.7 % (ref 36–46)
HEMOGLOBIN: 12.1 G/DL (ref 12–16)
IMMATURE GRANULOCYTES: ABNORMAL %
KETONES, URINE: NEGATIVE
LEUKOCYTE ESTERASE, URINE: ABNORMAL
LYMPHOCYTES # BLD: 12 % (ref 24–44)
MCH RBC QN AUTO: 32.4 PG (ref 26–34)
MCHC RBC AUTO-ENTMCNC: 34 G/DL (ref 31–37)
MCV RBC AUTO: 95.2 FL (ref 80–100)
MDMA URINE: NORMAL
METHADONE SCREEN, URINE: NEGATIVE
METHAMPHETAMINE, URINE: NORMAL
MONOCYTES # BLD: 3 % (ref 1–7)
MORPHOLOGY: NORMAL
MUCUS: ABNORMAL
NITRITE, URINE: POSITIVE
NRBC AUTOMATED: ABNORMAL PER 100 WBC
OPIATES, URINE: NEGATIVE
OTHER OBSERVATIONS UA: ABNORMAL
OXYCODONE SCREEN URINE: NEGATIVE
PDW BLD-RTO: 13.2 % (ref 11.5–14.9)
PH UA: 7 (ref 5–8)
PHENCYCLIDINE, URINE: NEGATIVE
PLATELET # BLD: 409 K/UL (ref 150–450)
PLATELET ESTIMATE: ABNORMAL
PMV BLD AUTO: 7.9 FL (ref 6–12)
PROPOXYPHENE, URINE: NORMAL
PROTEIN UA: ABNORMAL
RBC # BLD: 3.75 M/UL (ref 4–5.2)
RBC # BLD: ABNORMAL 10*6/UL
RBC UA: ABNORMAL /HPF
RENAL EPITHELIAL, UA: ABNORMAL /HPF
SEG NEUTROPHILS: 85 % (ref 36–66)
SEGMENTED NEUTROPHILS ABSOLUTE COUNT: 13.94 K/UL (ref 1.3–9.1)
SPECIFIC GRAVITY UA: 1.02 (ref 1–1.03)
T. PALLIDUM, IGG: NONREACTIVE
TEST INFORMATION: NORMAL
TRICHOMONAS: ABNORMAL
TRICYCLIC ANTIDEPRESSANTS, UR: NORMAL
TURBIDITY: ABNORMAL
URINE HGB: ABNORMAL
UROBILINOGEN, URINE: NORMAL
WBC # BLD: 16.4 K/UL (ref 3.5–11)
WBC # BLD: ABNORMAL 10*3/UL
WBC UA: ABNORMAL /HPF
YEAST: ABNORMAL

## 2018-04-21 PROCEDURE — 36415 COLL VENOUS BLD VENIPUNCTURE: CPT

## 2018-04-21 PROCEDURE — 85025 COMPLETE CBC W/AUTO DIFF WBC: CPT

## 2018-04-21 PROCEDURE — 6370000000 HC RX 637 (ALT 250 FOR IP): Performed by: STUDENT IN AN ORGANIZED HEALTH CARE EDUCATION/TRAINING PROGRAM

## 2018-04-21 RX ORDER — PSEUDOEPHEDRINE HCL 30 MG
100 TABLET ORAL 2 TIMES DAILY
Qty: 60 CAPSULE | Refills: 0 | Status: SHIPPED | OUTPATIENT
Start: 2018-04-21

## 2018-04-21 RX ORDER — IBUPROFEN 800 MG/1
800 TABLET ORAL EVERY 8 HOURS PRN
Qty: 20 TABLET | Refills: 0 | Status: SHIPPED | OUTPATIENT
Start: 2018-04-21 | End: 2019-11-01

## 2018-04-21 RX ADMIN — IBUPROFEN 800 MG: 800 TABLET ORAL at 11:58

## 2018-04-21 ASSESSMENT — PAIN SCALES - GENERAL
PAINLEVEL_OUTOF10: 0
PAINLEVEL_OUTOF10: 3

## 2018-04-22 LAB
CULTURE: ABNORMAL
CULTURE: ABNORMAL
Lab: ABNORMAL
ORGANISM: ABNORMAL
SPECIMEN DESCRIPTION: ABNORMAL
SPECIMEN DESCRIPTION: ABNORMAL
STATUS: ABNORMAL

## 2018-04-23 LAB
CULTURE: ABNORMAL
CULTURE: ABNORMAL
Lab: ABNORMAL
SPECIMEN DESCRIPTION: ABNORMAL
SPECIMEN DESCRIPTION: ABNORMAL
STATUS: ABNORMAL

## 2018-04-25 LAB — SURGICAL PATHOLOGY REPORT: NORMAL

## 2019-11-01 ENCOUNTER — HOSPITAL ENCOUNTER (EMERGENCY)
Age: 39
Discharge: HOME | End: 2019-11-01
Attending: EMERGENCY MEDICINE
Payer: MEDICARE

## 2019-11-01 VITALS
RESPIRATION RATE: 15 BRPM | WEIGHT: 250 LBS | BODY MASS INDEX: 40.18 KG/M2 | OXYGEN SATURATION: 96 % | SYSTOLIC BLOOD PRESSURE: 144 MMHG | DIASTOLIC BLOOD PRESSURE: 99 MMHG | HEIGHT: 66 IN | HEART RATE: 89 BPM | TEMPERATURE: 98.2 F

## 2019-11-01 DIAGNOSIS — S61.216A LACERATION OF RIGHT LITTLE FINGER WITHOUT FOREIGN BODY, NAIL DAMAGE STATUS UNSPECIFIED, INITIAL ENCOUNTER: Primary | ICD-10-CM

## 2019-11-01 PROCEDURE — 6370000000 HC RX 637 (ALT 250 FOR IP): Performed by: EMERGENCY MEDICINE

## 2019-11-01 PROCEDURE — 2500000003 HC RX 250 WO HCPCS: Performed by: EMERGENCY MEDICINE

## 2019-11-01 PROCEDURE — 12001 RPR S/N/AX/GEN/TRNK 2.5CM/<: CPT

## 2019-11-01 PROCEDURE — 99282 EMERGENCY DEPT VISIT SF MDM: CPT

## 2019-11-01 RX ORDER — IBUPROFEN 600 MG/1
600 TABLET ORAL EVERY 6 HOURS PRN
Qty: 20 TABLET | Refills: 0 | Status: SHIPPED | OUTPATIENT
Start: 2019-11-01

## 2019-11-01 RX ORDER — LIDOCAINE HYDROCHLORIDE AND EPINEPHRINE 10; 10 MG/ML; UG/ML
10 INJECTION, SOLUTION INFILTRATION; PERINEURAL ONCE
Status: DISCONTINUED | OUTPATIENT
Start: 2019-11-01 | End: 2019-11-01

## 2019-11-01 RX ORDER — IBUPROFEN 200 MG
400 TABLET ORAL ONCE
Status: COMPLETED | OUTPATIENT
Start: 2019-11-01 | End: 2019-11-01

## 2019-11-01 RX ORDER — LIDOCAINE HYDROCHLORIDE 10 MG/ML
10 INJECTION, SOLUTION INFILTRATION; PERINEURAL ONCE
Status: COMPLETED | OUTPATIENT
Start: 2019-11-01 | End: 2019-11-01

## 2019-11-01 RX ADMIN — IBUPROFEN 400 MG: 200 TABLET, FILM COATED ORAL at 21:44

## 2019-11-01 RX ADMIN — LIDOCAINE HYDROCHLORIDE 10 ML: 10 INJECTION, SOLUTION INFILTRATION; PERINEURAL at 21:42

## 2019-11-01 ASSESSMENT — PAIN SCALES - GENERAL
PAINLEVEL_OUTOF10: 8
PAINLEVEL_OUTOF10: 8
PAINLEVEL_OUTOF10: 2

## 2019-11-01 ASSESSMENT — ENCOUNTER SYMPTOMS
BACK PAIN: 0
SORE THROAT: 0
VOMITING: 0
COUGH: 0
SHORTNESS OF BREATH: 0
DIARRHEA: 0
ABDOMINAL PAIN: 0

## 2019-11-01 ASSESSMENT — PAIN DESCRIPTION - LOCATION: LOCATION: FINGER (COMMENT WHICH ONE)

## 2019-11-01 ASSESSMENT — PAIN DESCRIPTION - ORIENTATION: ORIENTATION: RIGHT
